# Patient Record
Sex: FEMALE | HISPANIC OR LATINO | Employment: STUDENT | URBAN - METROPOLITAN AREA
[De-identification: names, ages, dates, MRNs, and addresses within clinical notes are randomized per-mention and may not be internally consistent; named-entity substitution may affect disease eponyms.]

---

## 2019-10-21 ENCOUNTER — OFFICE VISIT (OUTPATIENT)
Dept: MEDICAL GROUP | Facility: PHYSICIAN GROUP | Age: 19
End: 2019-10-21
Payer: COMMERCIAL

## 2019-10-21 VITALS
OXYGEN SATURATION: 99 % | HEIGHT: 65 IN | SYSTOLIC BLOOD PRESSURE: 104 MMHG | TEMPERATURE: 98.1 F | DIASTOLIC BLOOD PRESSURE: 66 MMHG | HEART RATE: 68 BPM | WEIGHT: 183 LBS | BODY MASS INDEX: 30.49 KG/M2

## 2019-10-21 DIAGNOSIS — E66.9 OBESITY (BMI 30.0-34.9): ICD-10-CM

## 2019-10-21 DIAGNOSIS — Z00.00 HEALTHCARE MAINTENANCE: ICD-10-CM

## 2019-10-21 DIAGNOSIS — Z23 NEED FOR VACCINATION: ICD-10-CM

## 2019-10-21 DIAGNOSIS — Z23 NEED FOR DTAP VACCINE: ICD-10-CM

## 2019-10-21 PROCEDURE — 99385 PREV VISIT NEW AGE 18-39: CPT | Mod: 25 | Performed by: INTERNAL MEDICINE

## 2019-10-21 PROCEDURE — 90715 TDAP VACCINE 7 YRS/> IM: CPT | Performed by: INTERNAL MEDICINE

## 2019-10-21 PROCEDURE — 90471 IMMUNIZATION ADMIN: CPT | Performed by: INTERNAL MEDICINE

## 2019-10-21 RX ORDER — DESOGESTREL AND ETHINYL ESTRADIOL 0.15-0.03
1 KIT ORAL
Refills: 0 | COMMUNITY
Start: 2019-10-06

## 2019-10-21 NOTE — PROGRESS NOTES
Annual Wellness Visit       CC: Annual Preventive Health Assessment      HPI: Laurie Francis is a 19 y.o. female presented to the clinic today for annual wellness visit.        Patient Active Problem List    Diagnosis Date Noted   • RLQ abdominal pain, chronic and recurrent since 10/2014 04/07/2015   • Bilateral shoulder pain 03/06/2014       History reviewed. No pertinent past medical history.    Current Outpatient Medications   Medication Sig Dispense Refill   • ibuprofen (MOTRIN) 200 MG TABS Take 200 mg by mouth as needed.     • ENSKYCE 0.15-30 MG-MCG per tablet Take 1 Tab by mouth.  0   • methylPREDNISolone (MEDROL DOSEPACK) 4 MG tablet As directed 1 Kit 0     No current facility-administered medications for this visit.        Allergies as of 10/21/2019   • (No Known Allergies)       Social History     Socioeconomic History   • Marital status: Single     Spouse name: Not on file   • Number of children: Not on file   • Years of education: Not on file   • Highest education level: Not on file   Occupational History   • Not on file   Social Needs   • Financial resource strain: Not on file   • Food insecurity:     Worry: Not on file     Inability: Not on file   • Transportation needs:     Medical: Not on file     Non-medical: Not on file   Tobacco Use   • Smoking status: Never Smoker   • Smokeless tobacco: Never Used   Substance and Sexual Activity   • Alcohol use: No     Alcohol/week: 0.0 oz   • Drug use: No   • Sexual activity: Not on file   Lifestyle   • Physical activity:     Days per week: Not on file     Minutes per session: Not on file   • Stress: Not on file   Relationships   • Social connections:     Talks on phone: Not on file     Gets together: Not on file     Attends Orthodoxy service: Not on file     Active member of club or organization: Not on file     Attends meetings of clubs or organizations: Not on file     Relationship status: Not on file   • Intimate partner violence:     Fear of current or ex  partner: Not on file     Emotionally abused: Not on file     Physically abused: Not on file     Forced sexual activity: Not on file   Other Topics Concern   • Behavioral problems Not Asked   • Interpersonal relationships Not Asked   • Sad or not enjoying activities Not Asked   • Suicidal thoughts Not Asked   • Poor school performance Not Asked   • Reading difficulties Not Asked   • Speech difficulties Not Asked   • Writing difficulties Not Asked   • Inadequate sleep Not Asked   • Excessive TV viewing Not Asked   • Excessive video game use Not Asked   • Inadequate exercise Not Asked   • Sports related Not Asked   • Poor diet Not Asked   • Family concerns for drug/alcohol abuse Not Asked   • Poor oral hygiene Not Asked   • Bike safety Not Asked   • Family concerns vehicle safety Not Asked   Social History Narrative   • Not on file       Family History   Problem Relation Age of Onset   • Cancer Paternal Grandmother        History reviewed. No pertinent surgical history.     ROS    Health Risk Assessment   Depression Screening    Little interest or pleasure in doing things?     Feeling down, depressed , or hopeless?    Trouble falling or staying asleep, or sleeping too much?     Feeling tired or having little energy?     Poor appetite or overeating?     Feeling bad about yourself - or that you are a failure or have let yourself or your family down?    Trouble concentrating on things, such as reading the newspaper or watching television?    Moving or speaking so slowly that other people could have noticed.  Or the opposite - being so fidgety or restless that you have been moving around a lot more than usual?     Thoughts that you would be better off dead, or of hurting yourself?     Patient Health Questionnaire Score:      If depressive symptoms identified deferred to follow up visit unless specifically addressed in assessment and plan.    Interpretation of PHQ-9 Total Score   Score Severity   1-4 No Depression   5-9  Mild Depression   10-14 Moderate Depression   15-19 Moderately Severe Depression   20-27 Severe Depression      Screening for Cognitive Impairment    Three Minute Recall (village, kitchen, baby)  /3    Tian clock face with all 12 numbers and set the hands to show 10 past 10.       Cognitive concerns identified deferred for follow up unless specifically addressed in assessment and plan.    Fall Risk Assessment    Has the patient had two or more falls in the last year or any fall with injury in the last year?       Safety Assessment    Throw rugs on floor.     Handrails on all stairs.     Good lighting in all hallways.     Difficulty hearing.     Patient counseled about all safety risks that were identified.    Functional Assessment ADLs    Are there any barriers preventing you from cooking for yourself or meeting nutritional needs?   .    Are there any barriers preventing you from driving safely or obtaining transportation?   .    Are there any barriers preventing you from using a telephone or calling for help?   .    Are there any barriers preventing you from shopping?   .    Are there any barriers preventing you from taking care of your own finances?   .    Are there any barriers preventing you from managing your medications?   .    Are there any barriers preventing you from showering, bathing or dressing yourself?  .    Are you currently engaging in any exercise or physical activity?   .     What is your perception of your health?   .      Health Maintenance Summary                IMM HPV VACCINE Overdue 1/20/2010      Done 9/21/2009 Imm Admin: HPV Quadrivalent Vaccine (GARDASIL)     Patient has more history with this topic...    CHLAMYDIA SCREENING Overdue 6/10/2014     IMM MENINGOCOCCAL B VACCINE HEALTHY PATIENTS AGED 16 TO 23 Overdue 6/10/2014     PAP SMEAR Overdue 6/10/2019     IMM DTaP/Tdap/Td Vaccine Overdue 6/12/2019      Done 6/12/2009 Imm Admin: Tdap Vaccine     Patient has more history with this topic...     IMM INFLUENZA Overdue 2019           Patient Care Team:  Josephine Burton M.D. as PCP - General (Internal Medicine)        Immunization:   1. Influenza {Influenza:}     2. Tetanus, Diphtheria, and Pertussis (Tdap) {Tdap:}      3. Pneumococcal vaccines   {Pneumococcal vaccines:}    4. Hepatitis A  {Hepatitis A vaccine:}    5. Hepatitis B  {Hepatitis B vaccine:}  6. Shingrix vaccine: {HERPES:}       Comments:  ***      Infectious Diseases     1. HIV    {HIV screen:}    2. Hepatitis B virus    {Hepatitis B Screen:}    3. Hepatitis C virus (HCV)    {Hepatitis C screen:}    4. Tuberculosis {Tuberculosis screen:}     5.STIs  Chlamydia / Gonorrhea    • {Chlamydia / Gonorrhea  screen:}  1.   1. Syphilis     • {Syphilis screen:}    Comments:  ***      Metabolic History:    1. Coronary artery disease and stroke: lipid profile is {LIPID:}, ASCVD ***  {ASCVD:}    2. Hypertension***  {Hypertension:}    3. Diabetes mellitus***    {Diabetes:}    Comments:  ***      Cancer Screenin. Cervical cancer: {Cervical cancer:}  PAP Smear: last PAP smear was done in ***, {PAP:}, next PAP ***   Comments:  ***    2. Colorectal cancer (average risk)    {colon cancer:}    3. Breast cancer    {Breast cancer:}     4. Prostate cancer    {Prostate cancer:}    Comments:  ***      Lifestyle and Functioning:     · Physical activity:***  · Alcohol use: ***, AUDIT-C ***.  · Obesity Body mass index is 30.45 kg/m². {Obesity:}  · Depression: PHQ2 score ***.  · Smoking {Smoking,:}  · Vitamin supplements: {Vitamin supplementation:}  · DJD: {Degenerative Joint disease prevention:}    Comments:   ***    Behavioral      1. Depression {Depression:}    2. Alcohol abuse {Alcohol use screen:}    3. Smoking {Smoking,:}     4. Polysubstance abuse (IVDU, cocaine, heroin, etc.) - poisoning  {Polysubstanceabuse:18986}    Comments:  ***      Chief Complaint   Patient presents with   • Annual Exam   :    Depression Screening-0- none, 1- several, 2 more than half, 3 almost every    Little interest or pleasure in doing things?  ***           Feeling down, depressed , or hopeless?  ***        Score of first two questions:   ***    Trouble falling or staying asleep, or sleeping too much?  ***      Feeling tired or having little energy? ***       Poor appetite or overeating?   ***     Feeling bad about yourself - or that you are a failure or have let yourself or your family down? ***       Trouble concentrating on things, such as reading the newspaper or watching television? ***       Moving or speaking so slowly that other people could have noticed.  Or the opposite - being so fidgety or restless that you have been moving around a lot more than usual?  ***      Thoughts that you would be better off dead, or of hurting yourself?   ***     Patient Health Questionnaire Score:    Depression Screen (PHQ-2/PHQ-9) 2015   PHQ-2 Total Score 0       Interpretation of PHQ-9 Total Score   Score Severity   1-4 Minimal Depression   5-9 Mild Depression   10-14 Moderate Depression   15-19 Moderately Severe Depression   20-27 Severe Depression    Depressive symptoms identified deferred to follow up visit unless specifically addressed in assesment and plan.      Screening for Cognitive Impairment    Three Minute Recall (banana, sunrise, fence)  ***/3    Draw clock face with all 12 numbers set to the hand to show 10 minures past 11 o'clock {NEGATIVE DEF NE}, no defect noted     Cognitive concerns identified deferred for follow up unless specifically addressed in assesment and plan.    Timed Up and Go Test:    FALL RISK ASSESSMENT    Up an Go Test score:        A time of over 14 seconds identifies patient as potential fall risk.           Safety Assessment    Throw rugs on floor.   {YES  "(DEF)/NO:72434:a:\"Yes\"}    Handrails on all stairs.   {YES (DEF)/NO:58841:a:\"Yes\"}    Good lighting in all hallways.   {YES (DEF)/NO:85444:a:\"Yes\"}    Difficulty hearing.  {YES (DEF)/NO:04156:a:\"Yes\"}     Patient counseled about all safety risks that were identified.      Functional Assessment ADLs    Are there any barriers preventing you from cooking for yourself or meeting nutritional needs?  {YES (DEF)/NO:48592:a:\"Yes\"}     Are there any barriers preventing you from driving safely or obtaining transportation?  {YES (DEF)/NO:16047:a:\"Yes\"}     Are there any barriers preventing you from using a telephone or calling for help?   {YES (DEF)/NO:54442:a:\"Yes\"}      Are there any barriers preventing you from shopping?   {YES (DEF)/NO:35305:a:\"Yes\"}    Are there any barriers preventing you from taking care of your own finances?     {YES (DEF)/NO:84085:a:\"Yes\"}    Are there any barriers preventing you from managing your medications?    {YES (DEF)/NO:07624:a:\"Yes\"}    Are currently engaing any exercise or physical activity?    {YES (DEF)/NO:57929:a:\"Yes\"}      Current Health Problems:    No problem-specific Assessment & Plan notes found for this encounter.      Past medical history, past surgical history, allergies, medications and social history were reviewed with the patient at today's visit and updated per their chart.    Exam:  /66 (BP Location: Right arm, Patient Position: Sitting, BP Cuff Size: Adult)   Pulse 68   Temp 36.7 °C (98.1 °F) (Temporal)   Ht 1.651 m (5' 5\")   Wt 83 kg (183 lb)   SpO2 99%   BMI 30.45 kg/m²   General:  Well nourished, well developed female in NAD  Head is grossly normal.  Neck: Supple without JVD or bruit. Thyroid is not enlarged.  Pulmonary: Clear to ausculation and percussion.  Normal effort. No rales, ronchi, or wheezing.  Cardiovascular: Regular rate and rhythm without murmur. Carotid and radial pulses are intact and equal bilaterally.  Extremities: no clubbing, cyanosis, or " edema.  ***    Physical Exam  __Physical Exam    Assessment and Plan    1. Need for vaccination  ***         Signed by: Josephine Burton M.D.    Please note that this dictation was created using voice recognition software. I have made every reasonable attempt to correct obvious errors, but I expect that there are errors of grammar and possibly content that I did not discover before finalizing the note.

## 2019-10-21 NOTE — PROGRESS NOTES
Chief Complaint   Patient presents with   • Annual Exam   • Establish Care         Laurie Francis is a 19 y.o. 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  Patient declined nutritional service at this time.     History reviewed. No pertinent past medical history.     Current Outpatient Medications on File Prior to Visit   Medication Sig Dispense Refill   • ibuprofen (MOTRIN) 200 MG TABS Take 200 mg by mouth as needed.     • ENSKYCE 0.15-30 MG-MCG per tablet Take 1 Tab by mouth.  0     No current facility-administered medications on file prior to visit.      No Known Allergies  Family History   Problem Relation Age of Onset   • No Known Problems Mother    • No Known Problems Father    • No Known Problems Brother    • No Known Problems Maternal Grandmother    • No Known Problems Maternal Grandfather    • Cancer Paternal Grandmother      Social History     Tobacco Use   • Smoking status: Never Smoker   • Smokeless tobacco: Never Used   Substance Use Topics   • Alcohol use: No     Alcohol/week: 0.0 oz     Comment: occasionally       Current Outpatient Medications   Medication Sig Dispense Refill   • ibuprofen (MOTRIN) 200 MG TABS Take 200 mg by mouth as needed.     • ENSKYCE 0.15-30 MG-MCG per tablet Take 1 Tab by mouth.  0     No current facility-administered medications for this visit.      Social History     Tobacco Use   • Smoking status: Never Smoker   • Smokeless tobacco: Never Used   Substance Use Topics   • Alcohol use: No     Alcohol/week: 0.0 oz     Comment: occasionally   • Drug use: No  "      Review Of Systems  Denies fever, chills, or sweats, unexplained weight changes  Skin: negative for rash, changing moles, abnormal pigmentation, hair or nail changes.  Eyes: negative for visual blurring, double vision, eye pain, floaters and discharge from eyes  Ears/Nose/Throat: negative for tinnitus, vertigo, oral or dental problem, hoarseness, frequent URI's, sinus trouble, persistent sore throat  Respiratory: negative for persistent cough, hemoptysis, dyspnea, wheezing  Cardiovascular: negative for palpitations, tachycardia, irregular heart beat, chest pain or pressure or peripheral edema.  Breast: Denies breast tenderness, mass,  changes in size or contour, or abnormal cyclic discomfort.  Gastrointestinal: negative for dysphagia or odynophagia, nausea, heartburn or reflux, abdominal pain, hemorrhoids, constipation or diarrhea, black stool or bloody stool  Genitourinary: negative for nocturia, dysuria, frequency, incontinence, abnormal vaginal discharge, dysparunia or abnormal vaginal bleeding  Musculoskeletal: negative for joint swelling and muscle pain/ soreness  Neurologic: negative for new or changing headaches, new weakness tremor  Psychiatric: negative for mood or sleep disturbance, anxiety, depression, sexual difficulties  Hematologic/Lymphatic/Immunologic: negative for pallor, unusual bruising, swollen glands, HIV risk factors  Endocrine: negative for temperature intolerance, polydipsia, polyuria.       PHYSICAL EXAMINATION:  /66 (BP Location: Right arm, Patient Position: Sitting, BP Cuff Size: Adult)   Pulse 68   Temp 36.7 °C (98.1 °F) (Temporal)   Ht 1.651 m (5' 5\")   Wt 83 kg (183 lb)   SpO2 99%   Body mass index is 30.45 kg/m².  Wt Readings from Last 4 Encounters:   10/21/19 83 kg (183 lb)   01/14/16 63.5 kg (140 lb) (76 %, Z= 0.72)*   04/07/15 64.6 kg (142 lb 6.4 oz) (81 %, Z= 0.86)*   03/06/14 68 kg (150 lb) (88 %, Z= 1.17)*     * Growth percentiles are based on CDC (Girls, 2-20 " Years) data.       Constitutional: Alert, no distress.  Skin: Warm, dry, good turgor, no rashes or suspicious lesions in visible areas.  Eye: pupils equal, round and reactive to light, conjunctivae clear, lids normal.  ENMT: Lips without lesions, good dentition, oropharynx clear. Pinnae skin normal with no lesions. TM pearly gray with normal light reflex.   Neck: supple, no masses. No submandibular, anterior cervical, or supraclavicular adenopathy. No carotid bruits. No thyromegaly.  Respiratory: Unlabored respiratory effort, lungs clear to auscultation, no wheezes, no ronchi.  Cardiovascular: Normal S1, S2, no murmur, no peripheral edema.  Abdomen: Abdomen Soft, non-tender, no masses, no hepatosplenomegaly. No CVAT.  Psych: Alert and oriented x3, normal affect and mood.  Musc/Skel: Normal motion UE and LE proximal and distal.        ASSESSMENT/PLAN:    Need for vaccination  - Tdap =>4yo IM    Healthcare maintenance  - Comp Metabolic Panel; Future  - Lipid Profile; Future      Obesity (BMI 30.0-34.9)    Obesity:  -eating habit: 2 small meal/day, small meal size, having vegetables/fruits, snack sometime, Soda denied, Fast food sometime,   -exercise regularly,     Plan:  -encourage eating healthy food, exercise regularly and avoid unhealthy food   - Discussed weight loss goal. Recommended portion control, watching carbs  -advised to join overweight anonymous, use [employer] smartphone eda as well as watch weight watcher program to help losing Wt.  - Comp Metabolic Panel; Future  - Lipid Profile; Future  -We will consider nutrition consult if necessary.    Labs ordered  Immunizations per orders  Pt counseled about skin care, diet, supplements, and exercise.    Return in about 1 year (around 10/21/2020) for follow up.    Josephine Burton M.D.    Please note that this dictation was created using voice recognition software. I have made every reasonable attempt to correct obvious errors, but I expect that there are  errors of grammar and possibly content that I did not discover before finalizing the note.

## 2019-10-22 NOTE — PROGRESS NOTES
Chief Complaint   Patient presents with   • Annual Exam   • Establish Care         Laurie Francis is a 19 y.o. 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  Patient declined nutritional service at this time.     History reviewed. No pertinent past medical history.     Current Outpatient Medications on File Prior to Visit   Medication Sig Dispense Refill   • ibuprofen (MOTRIN) 200 MG TABS Take 200 mg by mouth as needed.     • ENSKYCE 0.15-30 MG-MCG per tablet Take 1 Tab by mouth.  0     No current facility-administered medications on file prior to visit.      No Known Allergies  Family History   Problem Relation Age of Onset   • No Known Problems Mother    • No Known Problems Father    • No Known Problems Brother    • No Known Problems Maternal Grandmother    • No Known Problems Maternal Grandfather    • Cancer Paternal Grandmother      Social History     Tobacco Use   • Smoking status: Never Smoker   • Smokeless tobacco: Never Used   Substance Use Topics   • Alcohol use: No     Alcohol/week: 0.0 oz     Comment: occasionally       Current Outpatient Medications   Medication Sig Dispense Refill   • ibuprofen (MOTRIN) 200 MG TABS Take 200 mg by mouth as needed.     • ENSKYCE 0.15-30 MG-MCG per tablet Take 1 Tab by mouth.  0     No current facility-administered medications for this visit.      Social History     Tobacco Use   • Smoking status: Never Smoker   • Smokeless tobacco: Never Used   Substance Use Topics   • Alcohol use: No     Alcohol/week: 0.0 oz     Comment: occasionally   • Drug use: No  "      Review Of Systems  Denies fever, chills, or sweats, unexplained weight changes  Skin: negative for rash, changing moles, abnormal pigmentation, hair or nail changes.  Eyes: negative for visual blurring, double vision, eye pain, floaters and discharge from eyes  Ears/Nose/Throat: negative for tinnitus, vertigo, oral or dental problem, hoarseness, frequent URI's, sinus trouble, persistent sore throat  Respiratory: negative for persistent cough, hemoptysis, dyspnea, wheezing  Cardiovascular: negative for palpitations, tachycardia, irregular heart beat, chest pain or pressure or peripheral edema.  Breast: Denies breast tenderness, mass,  changes in size or contour, or abnormal cyclic discomfort.  Gastrointestinal: negative for dysphagia or odynophagia, nausea, heartburn or reflux, abdominal pain, hemorrhoids, constipation or diarrhea, black stool or bloody stool  Genitourinary: negative for nocturia, dysuria, frequency, incontinence, abnormal vaginal discharge, dysparunia or abnormal vaginal bleeding  Musculoskeletal: negative for joint swelling and muscle pain/ soreness  Neurologic: negative for new or changing headaches, new weakness tremor  Psychiatric: negative for mood or sleep disturbance, anxiety, depression, sexual difficulties  Hematologic/Lymphatic/Immunologic: negative for pallor, unusual bruising, swollen glands, HIV risk factors  Endocrine: negative for temperature intolerance, polydipsia, polyuria.       PHYSICAL EXAMINATION:  /66 (BP Location: Right arm, Patient Position: Sitting, BP Cuff Size: Adult)   Pulse 68   Temp 36.7 °C (98.1 °F) (Temporal)   Ht 1.651 m (5' 5\")   Wt 83 kg (183 lb)   SpO2 99%   Body mass index is 30.45 kg/m².  Wt Readings from Last 4 Encounters:   10/21/19 83 kg (183 lb)   01/14/16 63.5 kg (140 lb) (76 %, Z= 0.72)*   04/07/15 64.6 kg (142 lb 6.4 oz) (81 %, Z= 0.86)*   03/06/14 68 kg (150 lb) (88 %, Z= 1.17)*     * Growth percentiles are based on CDC (Girls, 2-20 " Years) data.       Constitutional: Alert, no distress.  Skin: Warm, dry, good turgor, no rashes or suspicious lesions in visible areas.  Eye: pupils equal, round and reactive to light, conjunctivae clear, lids normal.  ENMT: Lips without lesions, good dentition, oropharynx clear. Pinnae skin normal with no lesions. TM pearly gray with normal light reflex.   Neck: supple, no masses. No submandibular, anterior cervical, or supraclavicular adenopathy. No carotid bruits. No thyromegaly.  Respiratory: Unlabored respiratory effort, lungs clear to auscultation, no wheezes, no ronchi.  Cardiovascular: Normal S1, S2, no murmur, no peripheral edema.  Abdomen: Abdomen Soft, non-tender, no masses, no hepatosplenomegaly. No CVAT.  Psych: Alert and oriented x3, normal affect and mood.  Musc/Skel: Normal motion UE and LE proximal and distal.        ASSESSMENT/PLAN:    Need for vaccination  - Tdap =>6yo IM    Healthcare maintenance  - Comp Metabolic Panel; Future  - Lipid Profile; Future      Obesity (BMI 30.0-34.9)    Obesity:  -eating habit: 2 small meal/day, small meal size, having vegetables/fruits, snack sometime, Soda denied, Fast food sometime,   -exercise regularly,     Plan:  -encourage eating healthy food, exercise regularly and avoid unhealthy food   - Discussed weight loss goal. Recommended portion control, watching carbs  -advised to join overweight anonymous, use [employer] smartphone eda as well as watch weight watcher program to help losing Wt.  - Comp Metabolic Panel; Future  - Lipid Profile; Future  -We will consider nutrition consult if necessary.    Labs ordered  Immunizations per orders  Pt counseled about skin care, diet, supplements, and exercise.    Return in about 1 year (around 10/21/2020) for follow up.    Josephine Burton M.D.    Please note that this dictation was created using voice recognition software. I have made every reasonable attempt to correct obvious errors, but I expect that there are  errors of grammar and possibly content that I did not discover before finalizing the note.

## 2020-06-30 ENCOUNTER — NON-PROVIDER VISIT (OUTPATIENT)
Dept: URGENT CARE | Facility: PHYSICIAN GROUP | Age: 20
End: 2020-06-30

## 2020-06-30 DIAGNOSIS — Z11.1 ENCOUNTER FOR PPD TEST: ICD-10-CM

## 2020-06-30 PROCEDURE — 86580 TB INTRADERMAL TEST: CPT | Performed by: PHYSICIAN ASSISTANT

## 2020-07-02 ENCOUNTER — NON-PROVIDER VISIT (OUTPATIENT)
Dept: URGENT CARE | Facility: PHYSICIAN GROUP | Age: 20
End: 2020-07-02

## 2020-07-02 LAB — TB WHEAL 3D P 5 TU DIAM: NORMAL MM

## 2023-07-17 ENCOUNTER — DOCUMENTATION (OUTPATIENT)
Dept: HEALTH INFORMATION MANAGEMENT | Facility: OTHER | Age: 23
End: 2023-07-17
Payer: COMMERCIAL

## 2023-09-21 ENCOUNTER — TELEPHONE (OUTPATIENT)
Dept: HEALTH INFORMATION MANAGEMENT | Facility: OTHER | Age: 23
End: 2023-09-21
Payer: COMMERCIAL